# Patient Record
Sex: MALE | Race: WHITE | Employment: FULL TIME | URBAN - METROPOLITAN AREA
[De-identification: names, ages, dates, MRNs, and addresses within clinical notes are randomized per-mention and may not be internally consistent; named-entity substitution may affect disease eponyms.]

---

## 2019-10-22 PROBLEM — M76.62 ACHILLES TENDINITIS OF BOTH LOWER EXTREMITIES: Status: ACTIVE | Noted: 2019-10-22

## 2019-10-22 PROBLEM — M76.61 ACHILLES TENDINITIS OF BOTH LOWER EXTREMITIES: Status: ACTIVE | Noted: 2019-10-22

## 2021-04-19 ENCOUNTER — HOSPITAL ENCOUNTER (OUTPATIENT)
Dept: LAB | Age: 31
Discharge: HOME | End: 2021-04-19
Payer: COMMERCIAL

## 2021-04-19 DIAGNOSIS — G25.81 RLS (RESTLESS LEGS SYNDROME): ICD-10-CM

## 2021-04-19 LAB — FERRITIN SERPL-MCNC: 87 NG/ML (ref 8–388)

## 2021-04-19 PROCEDURE — 36415 COLL VENOUS BLD VENIPUNCTURE: CPT

## 2021-04-19 PROCEDURE — 82728 ASSAY OF FERRITIN: CPT

## 2021-05-12 ENCOUNTER — HOSPITAL ENCOUNTER (OUTPATIENT)
Dept: SLEEP MEDICINE | Age: 31
Discharge: HOME | End: 2021-05-12
Payer: COMMERCIAL

## 2021-05-12 PROCEDURE — 95806 SLEEP STUDY UNATT&RESP EFFT: CPT

## 2022-03-19 PROBLEM — M76.62 ACHILLES TENDINITIS OF BOTH LOWER EXTREMITIES: Status: ACTIVE | Noted: 2019-10-22

## 2022-03-19 PROBLEM — M76.61 ACHILLES TENDINITIS OF BOTH LOWER EXTREMITIES: Status: ACTIVE | Noted: 2019-10-22

## 2022-06-20 ENCOUNTER — OFFICE VISIT (OUTPATIENT)
Dept: ORTHOPEDIC SURGERY | Age: 32
End: 2022-06-20
Payer: COMMERCIAL

## 2022-06-20 VITALS — HEIGHT: 68 IN | WEIGHT: 185 LBS | BODY MASS INDEX: 28.04 KG/M2

## 2022-06-20 DIAGNOSIS — S43.431A LABRAL TEAR OF SHOULDER, RIGHT, INITIAL ENCOUNTER: ICD-10-CM

## 2022-06-20 DIAGNOSIS — M25.511 RIGHT SHOULDER PAIN, UNSPECIFIED CHRONICITY: Primary | ICD-10-CM

## 2022-06-20 PROCEDURE — 20610 DRAIN/INJ JOINT/BURSA W/O US: CPT | Performed by: PHYSICIAN ASSISTANT

## 2022-06-20 RX ORDER — METHYLPREDNISOLONE ACETATE 40 MG/ML
80 INJECTION, SUSPENSION INTRA-ARTICULAR; INTRALESIONAL; INTRAMUSCULAR; SOFT TISSUE ONCE
Status: COMPLETED | OUTPATIENT
Start: 2022-06-20 | End: 2022-06-20

## 2022-06-20 RX ADMIN — METHYLPREDNISOLONE ACETATE 80 MG: 40 INJECTION, SUSPENSION INTRA-ARTICULAR; INTRALESIONAL; INTRAMUSCULAR; SOFT TISSUE at 15:39

## 2022-06-20 NOTE — PROGRESS NOTES
Name: Cristhian Neville  YOB: 1990  Gender: male  MRN: [id number]    CC:   Chief Complaint   Patient presents with    Shoulder Pain     right shoulder pain    Right shoulder pain    HPI:  This patient presents with a 2-month history of Right shoulder pain. 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 He states that he feels some symptoms of instability. He notes superior and posterior shoulder pain. Shoulder pain worsens after being in a position for prolonged period of time and lifting. Patient does note some catching. Does note some mild interference with sleep. He is unsure if he had a possible dislocation several years ago. He has not had any treatment of this shoulder yet. No Known Allergies  History reviewed. No pertinent past medical history. History reviewed. No pertinent surgical history. Family History   Problem Relation Age of Onset    Heart Disease Maternal Grandfather      Social History     Socioeconomic History    Marital status: Single     Spouse name: Not on file    Number of children: Not on file    Years of education: Not on file    Highest education level: Not on file   Occupational History    Not on file   Tobacco Use    Smoking status: Never Smoker    Smokeless tobacco: Never Used   Substance and Sexual Activity    Alcohol use: No    Drug use: Never    Sexual activity: Not on file   Other Topics Concern    Not on file   Social History Narrative    Not on file     Social Determinants of Health     Financial Resource Strain:     Difficulty of Paying Living Expenses: Not on file   Food Insecurity:     Worried About Running Out of Food in the Last Year: Not on file    [place] of Food in the Last Year: Not on file   Transportation Needs:     Lack of Transportation (Medical): Not on file    Lack of Transportation (Non-Medical):  Not on file   Physical Activity:     Days of Exercise per Week: Not on file   [place] of Exercise per Session: Not on file   Stress:     Feeling of Stress : Not on file   Social Connections:     Frequency of Communication with Friends and Family: Not on file    Frequency of Social Gatherings with Friends and Family: Not on file    Attends [employer] Services: Not on file    Active Member of Clubs or Organizations: Not on file    Attends Club or Organization Meetings: Not on file    Marital Status: Not on file   Intimate Partner Violence:     Fear of Current or Ex-Partner: Not on file    Emotionally Abused: Not on file    Physically Abused: Not on file    Sexually Abused: Not on file   Housing Stability:     Unable to Pay for Housing in the Last Year: Not on file    Number of [place] in the Last Year: Not on file    Unstable Housing in the Last Year: Not on file        No flowsheet data found. Review of Systems  Noncontributory    Pertinent positives and negatives are addressed with the patient, particularly those related to musculoskeletal concerns. Non-orthopaedic concerns were referred back to the primary care physician. PE:    GEN: General appearance is that of a healthy patient, alert and oriented, in no distress. WDWN. HEENT:  Normocephalic, atraumatic. Extraocular muscles are intact. Neck:  Supple. Heart:  Regular pulse exam on all extremities. Good color and warmth noted. Lungs:  Normal non-labored breathing with no obvious shortness of breath. Abdomen:  WNL's. Skin:  No signs of rash or bruising. Pysch: Answers questions appropriately, AO X 3. MSK:    Cervical spine: No tenderness. Normal  active motion. Negative Spurling's maneuver.      SHOULDER   Right (Involved) left   Skin Intact Intact   Radial Pulses 2+ Symmetrical 2+ Symmetrical   Deformity None Normal   Myotomes Normal Normal   Dermatomes  Normal Normal    ROM  full Full   Strength  appropriate No weakness   Atrophy None noted None noted   Effusion/Swelling  None noted None noted Palpation  TTP at the shoulder AC joint and biceps tendon No tenderness   Bicep Tendon Rupture  Negative  Negative signs   Bear Hug, Belly Press Negative, negative  Negative   Crossed Arm Adduction Test Normal     Instability/Ant. Apprehension Test  does not have apprehension None noted   Impingement Positive   lesa Lynch, MAGGIE Negative      X-rays:   Grashey, axillary and outlet views of the Right shoulder that were obtained and reviewed today in the office, show a type 2 acromion. The humeral head is not high riding  . No evidence of fracture, arthritis, dislocation or loose body. Mild degenerative changes of the A-C joint. Impression: Right shoulder appears normal  mild    Assessment:     ICD-10-CM    1. Right shoulder pain, unspecified chronicity  M25.511 XR SHOULDER RIGHT (MIN 2 VIEWS)     Amb External Referral To Physical Therapy     methylPREDNISolone acetate (DEPO-MEDROL) injection 80 mg     DRAIN/INJECT LARGE JOINT/BURSA   2. Labral tear of shoulder, right, initial encounter  S43.431A Amb External Referral To Physical Therapy     methylPREDNISolone acetate (DEPO-MEDROL) injection 80 mg     DRAIN/INJECT LARGE JOINT/BURSA        Plan: I had a long discussion with the patient regarding the natural history of the problem, as well as treatment options. I discussed with the patient possible labral pathology. 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 We discussed physical therapy in order to help strengthen the surrounding Muscles. We Will Also Proceed with a Glenohumeral Injection. Patient Will Follow up in 6 Weeks. We Discussed Possible MRI If He Fails Have Improvement. Treatment:     Recommend therapy to evaluate and treat current complaints and pathology. A home exercise program was also discussed with the patient.   Procedure note: After discussion of risks and benefits including, but not limited to pain, infection, steroid flare, increased blood sugar, fat necrosis, skin discoloration, and injury to blood vessels or nerves, the patient verbally consented to proceed with a glenohumeral joint injection. They understand that we are using this is an alternative method of treatment and the decision to not proceed with elective major surgery. We have discussed this decision in detail. The affected right shoulder was sterilely prepped in standard fashion and injected with 2 cc of Depo-Medrol (40mg/ml), 2 cc of 1% Lidocaine, and 2 cc of 0.5% Marcaine into the joint space. The patient tolerated the injection well. Return in 6 weeks (on 8/1/2022), or if symptoms worsen or fail to improve. Thank you for the opportunity to see your patient. If you have any questions please give me a call.   Sincerely,    LORETA Brooke  06/22/22

## 2022-08-24 ENCOUNTER — OFFICE VISIT (OUTPATIENT)
Dept: ORTHOPEDIC SURGERY | Age: 32
End: 2022-08-24

## 2022-08-24 DIAGNOSIS — M25.311 OTHER INSTABILITY, RIGHT SHOULDER: ICD-10-CM

## 2022-08-24 DIAGNOSIS — M25.511 RIGHT SHOULDER PAIN, UNSPECIFIED CHRONICITY: ICD-10-CM

## 2022-08-24 DIAGNOSIS — S43.431A LABRAL TEAR OF SHOULDER, RIGHT, INITIAL ENCOUNTER: Primary | ICD-10-CM

## 2022-08-24 NOTE — PROGRESS NOTES
Name: Claribel Martinez  YOB: 1990  Gender: male  MRN: [id number]    CC:   Chief Complaint   Patient presents with    Shoulder Pain     Right shoulder recheck        HPI: Patient presents for FU of right shoulder possible labral pathology. He was seen eight weeks ago after having feelings of pain and instability of the shoulder. We gave him a glenohumeral injection as well as home exercise program.  The patient states the injection only gave two weeks relief. He notes catching and locking in the shoulder. He states deep and posterior pain. 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 He notes it is the worse in the morning. Denies numbness, tingling and neck pain. No Known Allergies  History reviewed. No pertinent past medical history. History reviewed. No pertinent surgical history. Family History   Problem Relation Age of Onset    Heart Disease Maternal Grandfather      Social History     Socioeconomic History    Marital status: Single     Spouse name: Not on file    Number of children: Not on file    Years of education: Not on file    Highest education level: Not on file   Occupational History    Not on file   Tobacco Use    Smoking status: Never    Smokeless tobacco: Never   Substance and Sexual Activity    Alcohol use: No    Drug use: Never    Sexual activity: Not on file   Other Topics Concern    Not on file   Social History Narrative    Not on file     Social Determinants of Health     Financial Resource Strain: Not on file   Food Insecurity: Not on file   Transportation Needs: Not on file   Physical Activity: Not on file   Stress: Not on file   Social Connections: Not on file   Intimate Partner Violence: Not on file   Housing Stability: Not on file        No flowsheet data found. Review of Systems  Non-contributory    PE right shoulder:    Full passive ROM with pain and clicking while going overhead. Pain to palpation of the biceps tendon.   Positive o'harry's test.  When the patient is supine with the shoulder abduction the 90 degrees, the patient has very limited external rotation. Patient feels it gets stuck as do I on exam.  Positive anterior translation with load and shift. Distal motor function, sensation and pulses intact. There is obvious scapular dyskinesia with winging when going overhead. A/Plan:     ICD-10-CM    1. Labral tear of shoulder, right, initial encounter  S43.431A MRI SHOULDER RIGHT W CONTRAST      2. Right shoulder pain, unspecified chronicity  M25.511 MRI SHOULDER RIGHT W CONTRAST      3. Other instability, right shoulder  M25.311 MRI SHOULDER RIGHT W CONTRAST           I am concerned for labral pathology or even paralabral cyst . Patient has tried and failed conservative treatment with continued pain and instability. We will order an MRI arthrogram and see the patient back after imaging to discuss definitive plans and management. Also discussed possible EMG/NCV if no large labral tear given winging of scapula. Given the chronicity and severity of the patient's symptoms, we will obtain an MRI. We will see them back after the scan and make a determination regarding further treatment. If there is a tear or other problem, they may require surgery. If negative, would recommend NSAID's, PT, or injection. They are amenable to this treatment plan. No follow-ups on file.         Ela Payne  08/24/22

## 2022-09-08 DIAGNOSIS — S43.431A LABRAL TEAR OF SHOULDER, RIGHT, INITIAL ENCOUNTER: ICD-10-CM

## 2022-09-08 DIAGNOSIS — M25.511 RIGHT SHOULDER PAIN, UNSPECIFIED CHRONICITY: ICD-10-CM

## 2022-09-08 DIAGNOSIS — M25.311 OTHER INSTABILITY, RIGHT SHOULDER: ICD-10-CM

## 2022-09-14 ENCOUNTER — OFFICE VISIT (OUTPATIENT)
Dept: ORTHOPEDIC SURGERY | Age: 32
End: 2022-09-14
Payer: COMMERCIAL

## 2022-09-14 DIAGNOSIS — M25.511 RIGHT SHOULDER PAIN, UNSPECIFIED CHRONICITY: Primary | ICD-10-CM

## 2022-09-14 DIAGNOSIS — S43.431A LABRAL TEAR OF SHOULDER, RIGHT, INITIAL ENCOUNTER: ICD-10-CM

## 2022-09-14 DIAGNOSIS — M25.311 OTHER INSTABILITY, RIGHT SHOULDER: ICD-10-CM

## 2022-09-14 PROCEDURE — 99204 OFFICE O/P NEW MOD 45 MIN: CPT | Performed by: ORTHOPAEDIC SURGERY

## 2022-09-14 NOTE — PROGRESS NOTES
Name: Emil Meier  YOB: 1990  Gender: male  MRN: [id number]    CC:   Chief Complaint   Patient presents with    Follow-up     MRI results right shoulder   , Right shoulder(s) pain, instability    HPI:  This patient presents as an MRI FU from my PA. Patient has previously tried and failed [place] [address] [place] on 6-20-22. This patient presents with a 2-month history of Right shoulder pain. 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 He states that he feels some symptoms of instability. He notes superior and posterior shoulder pain. Shoulder pain worsens after being in a position for prolonged period of time and lifting. Patient does note some catching. Does note some mild interference with sleep. He is unsure if he had a possible dislocation several years ago. He has not had any treatment of this shoulder yet. No Known Allergies  History reviewed. No pertinent past medical history. History reviewed. No pertinent surgical history.   Family History   Problem Relation Age of Onset    Heart Disease Maternal Grandfather      Social History     Socioeconomic History    Marital status: Single     Spouse name: Not on file    Number of children: Not on file    Years of education: Not on file    Highest education level: Not on file   Occupational History    Not on file   Tobacco Use    Smoking status: Never    Smokeless tobacco: Never   Substance and Sexual Activity    Alcohol use: No    Drug use: Never    Sexual activity: Not on file   Other Topics Concern    Not on file   Social History Narrative    Not on file     Social Determinants of Health     Financial Resource Strain: Not on file   Food Insecurity: Not on file   Transportation Needs: Not on file   Physical Activity: Not on file   Stress: Not on file   Social Connections: Not on file   Intimate Partner Violence: Not on file   Housing Stability: Not on file        No flowsheet data found.    Review of Systems  Non-contributory   Also noted on the patient medical history form on the chart and are reviewed today. Pertinent positives and negatives are addressed with the patient, particularly those related to musculoskeletal concerns. Non-orthopaedic concerns were referred back to the primary care physician. PE:    GEN: General appearance is that of a healthy patient, alert and oriented, in no distress. WDWN. HEENT:  Normocephalic, atraumatic. Extraocular muscles are intact. Neck:  Supple, no lymphadenopathy. Heart:  Regular pulse exam on all extremities. Good color and warmth noted. Lungs:  Normal non-labored breathing with no obvious shortness of breath. Abdomen:  WNL's. Skin:  No signs of rash or bruising. Pysch: Answers questions appropriately, AO X 3. MSK:  Cervical spine: No tenderness. Good active motion. Negative Spurling's maneuver     SHOULDER   Right (involved)  Left   Skin Intact Intact   Radial Pulses 2+ symmetrical  2+ symmetrical   Myotomes Normal Normal   Dermatomes  Normal Normal   ROM Today he has full passive motion. Full   Strength Mild weakness through ER/IR/Flex has improvement with scapular stabilization. Also has improved range of motion with scapular assistance No weakness   Atrophy None noted None noted   Effusion/Swelling  Minimal swelling None   Palpation Mild anterior shoulder, minimal AC No Tenderness   Bicep Tendon Rupture  Negative, cysts [place]'s causes discomfort positive dynamic abduction shear as well Negative   Lift off test Not tested Not tested   Crossed Arm Adduction Test Not tested Not tested   Instability/Ant.  Apprehension Test Negative None   Impingement Negative Negative        Positive scapular winging positive scapular dyskinesia on dynamic exam    MRI arthrogram right shoulder from 9-8-22:  TECHNIQUE: Multiplanar imaging of the right shoulder was performed using a  shoulder coil configuration in a 1.5 Glo magnet following a fluoroscopic  arthrogram and injection of intraarticular diluted gadolinium. T1  fat-suppressed, spin-density fat-suppressed, T1 and T2 spin-echo sequences  were utilized. FINDINGS: Rim-rent along the anterior to mid footprint of the supraspinatus  tendon. Moderate grade tendinosis of the articular side of the tendon. Mild tendinosis of the footprint insertion of the infraspinatus tendon. Subscapularis tendon appears to be intact. Teres minor tendon intact. Reactive osteitis along the [facility] joint. Type 2 acromion. Moderate amount of noncontrast containing of fluid the subacromial subdeltoid  bursa. Intracapsular and extracapsular segments of biceps tendon intact. There is some intermediate signal noted in the bicipital anchor at its base  suggested on coronal series 5 and series 7, a images 11/12. No detached labral tear. Mild reactive marrow edema of the greater tuberosity. Marrow signal in the  glenoid intact. IMPRESSION: Rim-rent see of the supraspinatus footprint with moderate grade  articular sided tendinosis. Mild footprint tendinosis of the infraspinatus tendon. Reactive osteitis of the [facility] joint. Moderate amount of noncontrast containing fluid the subacromial subdeltoid  bursa. Question low grade SLAP injury. No detached labral tear. Mild reactive marrow edema greater tuberosity. I have independently reviewed the MRI and agree there is [facility] arthritic change. There is evidence of possible separation of the anterior superior labral tissue indicative of questionable SLAP. Small rim rent type tear more on the articular side and bursal side of the supraspinatus seen on coronal image 12 and sagittal image 5. There is an abnormal amount of edema seen superior to the subscap which does travel more medial into the muscle belly than I would expect. No obvious cystic change seen but could not rule out possible muscular strain based on this imaging. Assessment:     ICD-10-CM    1.  Right shoulder pain, unspecified chronicity  M25.511 Ambulatory referral to Physical Therapy     Ambulatory referral to [address]      2. Other instability, right shoulder  M25.311 Ambulatory referral to Physical Therapy     Ambulatory referral to [address]      3. Labral tear of shoulder, right, initial encounter  S43.431A Ambulatory referral to Physical Therapy     Ambulatory referral to [address]:  I had a long discussion with the patient regarding the natural history of the problem, as well as treatment options. Discussed he definitely has scapular dyskinesia and winging which I think is related to his pain and functional issues. I think there could be a component of bicipital involvement also which may be chronic. Would suggest formal therapy to start before I would suggest any type of procedure to address the biceps. Treatment:   Nerve conduction study with EMG for long thoracic nerve evaluation. Recommend therapy to evaluate and treat current complaints and pathology. A home exercise program was also discussed with the patient. Return in about 6 weeks (around 10/26/2022). Thank you for the opportunity to see and take care of your patients. If you ever have any questions please give me a call.    Sincerely,  Modesta Espinosa MD  09/14/22

## 2022-09-21 ENCOUNTER — TELEPHONE (OUTPATIENT)
Dept: FAMILY MEDICINE CLINIC | Facility: CLINIC | Age: 32
End: 2022-09-21

## 2022-11-10 ENCOUNTER — TELEPHONE (OUTPATIENT)
Dept: INTERNAL MEDICINE CLINIC | Facility: CLINIC | Age: 32
End: 2022-11-10

## 2022-11-10 NOTE — TELEPHONE ENCOUNTER
Received fax from [address] [place] [address], need signed and returned. Placed in tray up front.   LT

## 2022-12-21 ENCOUNTER — TELEPHONE (OUTPATIENT)
Dept: FAMILY MEDICINE CLINIC | Facility: CLINIC | Age: 32
End: 2022-12-21

## 2022-12-29 ENCOUNTER — TELEPHONE (OUTPATIENT)
Dept: FAMILY MEDICINE CLINIC | Facility: CLINIC | Age: 32
End: 2022-12-29

## 2022-12-29 NOTE — TELEPHONE ENCOUNTER
Agree with him being scheduled with Dr. Sandi Rapp tomorrow to further evaluate so we will wait on any further treatment at this time

## 2022-12-29 NOTE — TELEPHONE ENCOUNTER
Patient called in on the triage patient complains of severe pressure in head at random times with pressure behind eyes  and temple and switches sides, patient does become slightly disoriented and dizziness patient went to Urgent care 12/24 and was given a shot of prednisone and also given a oral RX of same, patient states today is his last dose and is now starting to feel pressure again, pain level is at level 3, patient states no trouble breathing or mucus when blowing nose doesn't feel like sinus infection.  Patient is scheduled with Elvin tomorrow

## 2022-12-30 ENCOUNTER — OFFICE VISIT (OUTPATIENT)
Dept: FAMILY MEDICINE CLINIC | Facility: CLINIC | Age: 32
End: 2022-12-30
Payer: COMMERCIAL

## 2022-12-30 ENCOUNTER — TELEPHONE (OUTPATIENT)
Dept: FAMILY MEDICINE CLINIC | Facility: CLINIC | Age: 32
End: 2022-12-30

## 2022-12-30 ENCOUNTER — HOSPITAL ENCOUNTER (OUTPATIENT)
Dept: CT IMAGING | Age: 32
Discharge: HOME | End: 2022-12-30
Payer: COMMERCIAL

## 2022-12-30 VITALS
BODY MASS INDEX: 26.67 KG/M2 | DIASTOLIC BLOOD PRESSURE: 79 MMHG | HEART RATE: 82 BPM | HEIGHT: 68 IN | OXYGEN SATURATION: 98 % | TEMPERATURE: 97.6 F | RESPIRATION RATE: 16 BRPM | SYSTOLIC BLOOD PRESSURE: 127 MMHG | WEIGHT: 176 LBS

## 2022-12-30 DIAGNOSIS — U07.1 COVID: ICD-10-CM

## 2022-12-30 DIAGNOSIS — G44.52 NEW DAILY PERSISTENT HEADACHE: Primary | ICD-10-CM

## 2022-12-30 DIAGNOSIS — G44.52 NEW DAILY PERSISTENT HEADACHE: ICD-10-CM

## 2022-12-30 DIAGNOSIS — H53.9 VISUAL DISTURBANCE: ICD-10-CM

## 2022-12-30 PROCEDURE — 99214 OFFICE O/P EST MOD 30 MIN: CPT | Performed by: FAMILY MEDICINE

## 2022-12-30 PROCEDURE — 70450 CT HEAD/BRAIN W/O DYE: CPT

## 2022-12-30 ASSESSMENT — PATIENT HEALTH QUESTIONNAIRE - PHQ9
SUM OF ALL RESPONSES TO PHQ QUESTIONS 1-9: 0
2. FEELING DOWN, DEPRESSED OR HOPELESS: 0
SUM OF ALL RESPONSES TO PHQ QUESTIONS 1-9: 0
SUM OF ALL RESPONSES TO PHQ QUESTIONS 1-9: 0
1. LITTLE INTEREST OR PLEASURE IN DOING THINGS: 0
SUM OF ALL RESPONSES TO PHQ9 QUESTIONS 1 & 2: 0
SUM OF ALL RESPONSES TO PHQ QUESTIONS 1-9: 0

## 2022-12-30 NOTE — TELEPHONE ENCOUNTER
Patient was made aware of the negative head CT. There are no issues with his sinuses either. We will do ibuprofen for 2 days and if that does not help he will switch to Excedrin. He should follow-up with Dr. David Copeland in a week.

## 2022-12-30 NOTE — PROGRESS NOTES
[address] [facility] [place]  Phone: [phone] Fax [phone]  Homer Powell MD  12/30/2022           Mr. Deniz Anderson  is a 33y.o.  year old  male patient who comes in complaining of headache that he has had for the past several weeks. He had COVID about 3 December and had a dull headache and aching. He did not have cough or congestion or nasal pressure or diarrhea or nausea or vomiting. He did have fever to 101. He did lose his smell and taste. Then about a week later he started having headaches that he describes as pressure pressing down on his head and across his temple. It happens every day. It was very severe. He went to urgent care and they gave him a shot of prednisone as well as prednisone to take. It did not really help. He comes in because the head pressure and headache has persisted. He is also noticed that he seemed a little off balance or a little confused though he has been able to work without trouble. He also notes that his vision may be a little off from time to time. He has never had migraines. He does not have light or noise sensitivity. He does not have trouble moving 1 side of his body or the other. Does note that his taste and smell are almost 80% returned to normal.  Headache may be a little bit better. He is otherwise healthy. Mr. Deniz Anderson  has  has no past medical history on file. Mr. Deniz Anderson  has  has no past surgical history on file. Mr. Deniz Anderson   No current outpatient medications on file. No current facility-administered medications for this visit.        Mr. Castanon Christina History     Socioeconomic History    Marital status: Single     Spouse name: None    Number of children: None    Years of education: None    Highest education level: None   Tobacco Use    Smoking status: Never    Smokeless tobacco: Never   Substance and Sexual Activity    Alcohol use: No    Drug use: Never       Mr. Deniz Anderson Family History   Problem Relation Age of Onset    Heart Disease Maternal Grandfather             Mr. Gentry Ortez  has the following allergies: No Known Allergies    /79   Pulse 82   Temp 97.6 °F (36.4 °C)   Resp 16   Ht 5' 7.5\" (1.715 m)   Wt 176 lb (79.8 kg)   SpO2 98%   BMI 27.16 kg/m²     HEENT: Normocephalic, atraumatic, pupils equal and reactive to light. Tympanic membranes intact without erythema or edema. Oropharynx clear. Neck: Supple, no masses or thyromegaly. Lungs: clear to auscultation bilaterally. CV: regular rate and rhythm, without murmurs, rubs, or gallops  Neuro: Cranial nerves II through XII are intact, fundi without papilledema, DTRs are 2+ both upper and lower extremities. No focal weakness. Ext: No lower extremity edema. Sara Sneed was seen today for headache, dizziness and post-covid symptoms. Diagnoses and all orders for this visit:    New daily persistent headache  -     CT HEAD WO CONTRAST; Future    COVID  -     CT HEAD WO CONTRAST; Future    Visual disturbance  -     CT HEAD WO CONTRAST; Future    I am concerned that this is a post COVID headache but certainly because it is a new headache for him and its persisted for a long time we will get a head CT. He is to do ibuprofen two twice a day and if that does not work he can can try Excedrin. He is to follow-up with Dr. Manuel Hebert in 1 to 2 weeks.   Jose A Yuan MD

## 2023-01-02 ENCOUNTER — TELEPHONE (OUTPATIENT)
Dept: FAMILY MEDICINE CLINIC | Facility: CLINIC | Age: 33
End: 2023-01-02

## 2023-01-04 ENCOUNTER — TELEPHONE (OUTPATIENT)
Dept: FAMILY MEDICINE CLINIC | Facility: CLINIC | Age: 33
End: 2023-01-04

## 2023-01-04 NOTE — TELEPHONE ENCOUNTER
Pt called stating that he has left arm tingling/ numbness and facial numbness. Pt states that he is very fatigue and just having very weird body sensations. He has been to Urgent care and was told it was post covid. Pt is very concerned. I advised him to seek emergent care at the ER.  Pt is agreeable

## 2023-01-06 ENCOUNTER — OFFICE VISIT (OUTPATIENT)
Dept: FAMILY MEDICINE CLINIC | Facility: CLINIC | Age: 33
End: 2023-01-06
Payer: COMMERCIAL

## 2023-01-06 VITALS
BODY MASS INDEX: 26.58 KG/M2 | HEIGHT: 68 IN | WEIGHT: 175.4 LBS | TEMPERATURE: 99.9 F | HEART RATE: 76 BPM | RESPIRATION RATE: 16 BRPM | OXYGEN SATURATION: 98 % | SYSTOLIC BLOOD PRESSURE: 121 MMHG | DIASTOLIC BLOOD PRESSURE: 70 MMHG

## 2023-01-06 DIAGNOSIS — F41.1 GENERALIZED ANXIETY DISORDER WITH PANIC ATTACKS: ICD-10-CM

## 2023-01-06 DIAGNOSIS — U07.1 COVID: Primary | ICD-10-CM

## 2023-01-06 DIAGNOSIS — F41.0 GENERALIZED ANXIETY DISORDER WITH PANIC ATTACKS: ICD-10-CM

## 2023-01-06 PROCEDURE — 99213 OFFICE O/P EST LOW 20 MIN: CPT | Performed by: FAMILY MEDICINE

## 2023-01-06 RX ORDER — LORAZEPAM 1 MG/1
1 TABLET ORAL DAILY PRN
Qty: 10 TABLET | Refills: 0 | Status: SHIPPED | OUTPATIENT
Start: 2023-01-06 | End: 2023-01-20 | Stop reason: SDUPTHER

## 2023-01-06 RX ORDER — METOCLOPRAMIDE 5 MG/1
5 TABLET ORAL 4 TIMES DAILY
COMMUNITY

## 2023-01-06 RX ORDER — BUTALBITAL, ACETAMINOPHEN AND CAFFEINE 50; 325; 40 MG/1; MG/1; MG/1
1 TABLET ORAL EVERY 6 HOURS PRN
COMMUNITY
Start: 2023-01-04

## 2023-01-06 ASSESSMENT — PATIENT HEALTH QUESTIONNAIRE - PHQ9
SUM OF ALL RESPONSES TO PHQ QUESTIONS 1-9: 0
SUM OF ALL RESPONSES TO PHQ QUESTIONS 1-9: 0
2. FEELING DOWN, DEPRESSED OR HOPELESS: 0
SUM OF ALL RESPONSES TO PHQ QUESTIONS 1-9: 0
1. LITTLE INTEREST OR PLEASURE IN DOING THINGS: 0
SUM OF ALL RESPONSES TO PHQ9 QUESTIONS 1 & 2: 0
SUM OF ALL RESPONSES TO PHQ QUESTIONS 1-9: 0

## 2023-01-06 ASSESSMENT — ANXIETY QUESTIONNAIRES
6. BECOMING EASILY ANNOYED OR IRRITABLE: 0
5. BEING SO RESTLESS THAT IT IS HARD TO SIT STILL: 0
GAD7 TOTAL SCORE: 0
3. WORRYING TOO MUCH ABOUT DIFFERENT THINGS: 0
1. FEELING NERVOUS, ANXIOUS, OR ON EDGE: 0
IF YOU CHECKED OFF ANY PROBLEMS ON THIS QUESTIONNAIRE, HOW DIFFICULT HAVE THESE PROBLEMS MADE IT FOR YOU TO DO YOUR WORK, TAKE CARE OF THINGS AT HOME, OR GET ALONG WITH OTHER PEOPLE: NOT DIFFICULT AT ALL
2. NOT BEING ABLE TO STOP OR CONTROL WORRYING: 0
4. TROUBLE RELAXING: 0
7. FEELING AFRAID AS IF SOMETHING AWFUL MIGHT HAPPEN: 0

## 2023-01-06 NOTE — PROGRESS NOTES
Js Andre (:  1990) is a 33 y.o. male,Established patient, here for evaluation of the following chief complaint(s):  Follow-up (ED          ASSESSMENT/PLAN:  1. COVID  -     nirmatrelvir/ritonavir (PAXLOVID) 20 x 150 MG & 10 x 100MG TBPK; Take 3 tablets (two 150 mg nirmatrelvir and one 100 mg ritonavir tablets) by mouth every 12 hours for 5 days. , Disp-30 tablet, R-0Normal    Tested positiive for COVId in December; symptoms improved. re-tested positive 2 days ago; now with fatigue, fever. Encuraged paxlovid to improve symptoms. May use tylenol/ motrin for muscle aches, fever. Subjective   SUBJECTIVE/OBJECTIVE:  HPI    32 yo otherwise healthy male. Here for fatigue, headaches, fever, chills. Tested positive for COVID 2 days ago at urgent care. told pt he had covid via pcr test on Wed night. Fatigue has got much worse, headaches, head pressure over past 3 days, having tingling and numbness all over body, chills and sweating phases. Last night was the worst  He has not been vacinated for COVID. Review of Systems   Constitutional:  Positive for appetite change, chills, fatigue and fever. Respiratory:  Positive for cough. Negative for shortness of breath and wheezing. Cardiovascular: Negative. Negative for chest pain, palpitations and leg swelling. Gastrointestinal: Negative. Musculoskeletal:  Positive for myalgias. Neurological:  Positive for headaches. Objective   Physical Exam  Vitals and nursing note reviewed. Constitutional:       General: He is not in acute distress. Appearance: He is ill-appearing. He is not toxic-appearing. Cardiovascular:      Rate and Rhythm: Normal rate and regular rhythm. Pulmonary:      Effort: Pulmonary effort is normal. No respiratory distress. Breath sounds: Normal breath sounds. No wheezing. Skin:     General: Skin is warm and dry. Neurological:      Mental Status: He is alert.    Psychiatric:         Mood and Affect: Mood normal.         Behavior: Behavior normal.         Thought Content: Thought content normal.         Judgment: Judgment normal.        Vitals:    01/06/23 1208   BP: 121/70   Pulse: 76   Resp: 16   Temp: 99.9 °F (37.7 °C)   SpO2: 98%     No results found for this visit on 01/06/23. On this date 1/6/2023 I have spent 30 minutes reviewing previous notes, test results and face to face with the patient discussing the diagnosis and importance of compliance with the treatment plan as well as documenting on the day of the visit. An electronic signature was used to authenticate this note.     --Humphrey Morfin MD, MD

## 2023-01-16 ASSESSMENT — ENCOUNTER SYMPTOMS
COUGH: 1
GASTROINTESTINAL NEGATIVE: 1
WHEEZING: 0
SHORTNESS OF BREATH: 0

## 2023-01-20 ENCOUNTER — NURSE ONLY (OUTPATIENT)
Dept: FAMILY MEDICINE CLINIC | Facility: CLINIC | Age: 33
End: 2023-01-20

## 2023-01-20 ENCOUNTER — OFFICE VISIT (OUTPATIENT)
Dept: FAMILY MEDICINE CLINIC | Facility: CLINIC | Age: 33
End: 2023-01-20
Payer: COMMERCIAL

## 2023-01-20 VITALS
WEIGHT: 176.1 LBS | DIASTOLIC BLOOD PRESSURE: 77 MMHG | OXYGEN SATURATION: 98 % | HEIGHT: 68 IN | SYSTOLIC BLOOD PRESSURE: 123 MMHG | RESPIRATION RATE: 18 BRPM | BODY MASS INDEX: 26.69 KG/M2 | HEART RATE: 76 BPM | TEMPERATURE: 97.6 F

## 2023-01-20 DIAGNOSIS — R53.82 CHRONIC FATIGUE, UNSPECIFIED: ICD-10-CM

## 2023-01-20 DIAGNOSIS — G44.52 NEW DAILY PERSISTENT HEADACHE: ICD-10-CM

## 2023-01-20 DIAGNOSIS — R11.0 NAUSEA: ICD-10-CM

## 2023-01-20 DIAGNOSIS — Z86.16 HISTORY OF COVID-19: ICD-10-CM

## 2023-01-20 DIAGNOSIS — H53.9 VISUAL DISTURBANCE: ICD-10-CM

## 2023-01-20 DIAGNOSIS — F41.0 GENERALIZED ANXIETY DISORDER WITH PANIC ATTACKS: Primary | ICD-10-CM

## 2023-01-20 DIAGNOSIS — F41.1 GENERALIZED ANXIETY DISORDER WITH PANIC ATTACKS: Primary | ICD-10-CM

## 2023-01-20 LAB
ALBUMIN SERPL-MCNC: 3.8 G/DL (ref 3.5–5)
ALBUMIN/GLOB SERPL: 1.2 (ref 0.4–1.6)
ALP SERPL-CCNC: 54 U/L (ref 50–136)
ALT SERPL-CCNC: 36 U/L (ref 12–65)
ANION GAP SERPL CALC-SCNC: 6 MMOL/L (ref 2–11)
AST SERPL-CCNC: 14 U/L (ref 15–37)
BASOPHILS # BLD: 0 K/UL (ref 0–0.2)
BASOPHILS NFR BLD: 0 % (ref 0–2)
BILIRUB SERPL-MCNC: 0.3 MG/DL (ref 0.2–1.1)
BUN SERPL-MCNC: 15 MG/DL (ref 6–23)
CALCIUM SERPL-MCNC: 10 MG/DL (ref 8.3–10.4)
CHLORIDE SERPL-SCNC: 109 MMOL/L (ref 101–110)
CO2 SERPL-SCNC: 27 MMOL/L (ref 21–32)
CREAT SERPL-MCNC: 1.2 MG/DL (ref 0.8–1.5)
DIFFERENTIAL METHOD BLD: NORMAL
EOSINOPHIL # BLD: 0.2 K/UL (ref 0–0.8)
EOSINOPHIL NFR BLD: 3 % (ref 0.5–7.8)
ERYTHROCYTE [DISTWIDTH] IN BLOOD BY AUTOMATED COUNT: 12.8 % (ref 11.9–14.6)
GLOBULIN SER CALC-MCNC: 3.2 G/DL (ref 2.8–4.5)
GLUCOSE SERPL-MCNC: 93 MG/DL (ref 65–100)
HCT VFR BLD AUTO: 45.7 % (ref 41.1–50.3)
HGB BLD-MCNC: 14.7 G/DL (ref 13.6–17.2)
IMM GRANULOCYTES # BLD AUTO: 0 K/UL (ref 0–0.5)
IMM GRANULOCYTES NFR BLD AUTO: 0 % (ref 0–5)
LYMPHOCYTES # BLD: 2.8 K/UL (ref 0.5–4.6)
LYMPHOCYTES NFR BLD: 41 % (ref 13–44)
MCH RBC QN AUTO: 28.9 PG (ref 26.1–32.9)
MCHC RBC AUTO-ENTMCNC: 32.2 G/DL (ref 31.4–35)
MCV RBC AUTO: 89.8 FL (ref 82–102)
MONOCYTES # BLD: 0.5 K/UL (ref 0.1–1.3)
MONOCYTES NFR BLD: 7 % (ref 4–12)
NEUTS SEG # BLD: 3.5 K/UL (ref 1.7–8.2)
NEUTS SEG NFR BLD: 49 % (ref 43–78)
NRBC # BLD: 0 K/UL (ref 0–0.2)
PLATELET # BLD AUTO: 225 K/UL (ref 150–450)
PMV BLD AUTO: 11.3 FL (ref 9.4–12.3)
POTASSIUM SERPL-SCNC: 5.1 MMOL/L (ref 3.5–5.1)
PROT SERPL-MCNC: 7 G/DL (ref 6.3–8.2)
RBC # BLD AUTO: 5.09 M/UL (ref 4.23–5.6)
SODIUM SERPL-SCNC: 142 MMOL/L (ref 133–143)
TSH, 3RD GENERATION: 2.48 UIU/ML (ref 0.36–3.74)
WBC # BLD AUTO: 7 K/UL (ref 4.3–11.1)

## 2023-01-20 PROCEDURE — 99214 OFFICE O/P EST MOD 30 MIN: CPT | Performed by: FAMILY MEDICINE

## 2023-01-20 RX ORDER — LORAZEPAM 1 MG/1
1 TABLET ORAL DAILY PRN
Qty: 15 TABLET | Refills: 2 | Status: SHIPPED | OUTPATIENT
Start: 2023-01-20 | End: 2023-07-19

## 2023-01-20 RX ORDER — ONDANSETRON 8 MG/1
8 TABLET, ORALLY DISINTEGRATING ORAL DAILY
COMMUNITY
Start: 2023-01-16 | End: 2023-01-20 | Stop reason: SDUPTHER

## 2023-01-20 RX ORDER — ONDANSETRON 8 MG/1
8 TABLET, ORALLY DISINTEGRATING ORAL DAILY
Qty: 30 TABLET | Refills: 5 | Status: SHIPPED | OUTPATIENT
Start: 2023-01-20

## 2023-01-20 RX ORDER — AMOXICILLIN AND CLAVULANATE POTASSIUM 875; 125 MG/1; MG/1
TABLET, FILM COATED ORAL
COMMUNITY
Start: 2023-01-14

## 2023-01-20 RX ORDER — ESCITALOPRAM OXALATE 10 MG/1
10 TABLET ORAL DAILY
Qty: 30 TABLET | Refills: 3 | Status: SHIPPED | OUTPATIENT
Start: 2023-01-20

## 2023-01-20 ASSESSMENT — ENCOUNTER SYMPTOMS
GASTROINTESTINAL NEGATIVE: 1
RESPIRATORY NEGATIVE: 1

## 2023-01-20 NOTE — PROGRESS NOTES
Mitali Kat (:  1990) is a 33 y.o. male,Established patient, here for evaluation of the following chief complaint(s):  Follow-up (COVID/ Health issues, feels like he is slowly making progress         ASSESSMENT/PLAN:  1. Generalized anxiety disorder with panic attacks  -     escitalopram (LEXAPRO) 10 MG tablet; Take 1 tablet by mouth daily, Disp-30 tablet, R-3Normal  -     LORazepam (ATIVAN) 1 MG tablet; Take 1 tablet by mouth daily as needed (panic attacks) for up to 180 days. Max Daily Amount: 1 mg, Disp-15 tablet, R-2Normal  2. New daily persistent headache  3. History of COVID-19  4. Visual disturbance  5. Nausea  -     ondansetron (ZOFRAN-ODT) 8 MG TBDP disintegrating tablet; Take 1 tablet by mouth daily, Disp-30 tablet, R-5Normal  6. Chronic fatigue, unspecified  -     Comprehensive Metabolic Panel; Future  -     CBC with Auto Differential; Future  -     TSH; Future      Plan:  Check labs today to rule out other reasons for the chronic fatigue. He already has follow-up with neurology. Return in about 6 weeks (around 3/3/2023) for Labs today, EOV. Subjective   SUBJECTIVE/OBJECTIVE:  HPI  69-year-old male here for 2-week follow-up. Tested positive for COVID in early December and then again on . Since that time he has described Low energy: weakness, profound fatigue  \"Couldn't pull covers over him he was so weak\"  Muscle aches. Fever, chills. Intermittent headaches. O2 at home was wnl    He did take Paxil bid 3 weeks ago. Has seen his ENT:Dr. Nicole Mendoza:  Diagnosed with sinusitis via CT scan  Treated with Augmentin. Now, he feels marginally better. Feeling very anxious about his health conditions. Years ago was tried on citalopram but he reports he did not take it for long. We have prescribed him Ativan as needed for his anxiety/panic attacks  Willing to retry SSRI.       Review of Systems   Constitutional:  Negative for activity change, appetite change, chills, diaphoresis, fatigue and fever. Respiratory: Negative. Cardiovascular: Negative. Gastrointestinal: Negative. Skin: Negative. Neurological:  Negative for headaches. Psychiatric/Behavioral: Negative. Objective   Physical Exam  Vitals and nursing note reviewed. Constitutional:       General: He is not in acute distress. Appearance: He is not ill-appearing or toxic-appearing. Cardiovascular:      Rate and Rhythm: Normal rate and regular rhythm. Pulmonary:      Effort: Pulmonary effort is normal. No respiratory distress. Breath sounds: Normal breath sounds. No wheezing. Neurological:      General: No focal deficit present. Mental Status: He is alert and oriented to person, place, and time. Psychiatric:         Mood and Affect: Mood is anxious. Speech: Speech normal.         Behavior: Behavior is cooperative. Vitals:    01/20/23 0806   BP: 123/77   Pulse: 76   Resp: 18   Temp: 97.6 °F (36.4 °C)   SpO2: 98%       On this date 1/20/2023 I have spent 30 minutes reviewing previous notes, test results and face to face with the patient discussing the diagnosis and importance of compliance with the treatment plan as well as documenting on the day of the visit. An electronic signature was used to authenticate this note.     --Mark Patino MD, MD

## 2023-02-09 ENCOUNTER — NURSE ONLY (OUTPATIENT)
Dept: FAMILY MEDICINE CLINIC | Facility: CLINIC | Age: 33
End: 2023-02-09

## 2023-02-09 DIAGNOSIS — R53.82 CHRONIC FATIGUE, UNSPECIFIED: ICD-10-CM

## 2023-02-09 LAB
ERYTHROCYTE [SEDIMENTATION RATE] IN BLOOD: 3 MM/HR (ref 0–20)
T4 FREE SERPL-MCNC: 0.9 NG/DL (ref 0.78–1.46)

## 2023-02-10 LAB
ANA SER QL: NEGATIVE
RHEUMATOID FACT SER QL LA: NEGATIVE

## 2023-02-12 LAB — T3FREE SERPL-MCNC: 2.8 PG/ML (ref 2–4.4)

## 2023-03-03 ENCOUNTER — OFFICE VISIT (OUTPATIENT)
Dept: FAMILY MEDICINE CLINIC | Facility: CLINIC | Age: 33
End: 2023-03-03
Payer: COMMERCIAL

## 2023-03-03 VITALS
SYSTOLIC BLOOD PRESSURE: 113 MMHG | HEART RATE: 78 BPM | TEMPERATURE: 97.3 F | WEIGHT: 181.3 LBS | BODY MASS INDEX: 27.48 KG/M2 | DIASTOLIC BLOOD PRESSURE: 73 MMHG | RESPIRATION RATE: 18 BRPM | HEIGHT: 68 IN | OXYGEN SATURATION: 98 %

## 2023-03-03 DIAGNOSIS — U09.9 COVID-19 LONG HAULER: Primary | ICD-10-CM

## 2023-03-03 DIAGNOSIS — F41.0 GENERALIZED ANXIETY DISORDER WITH PANIC ATTACKS: ICD-10-CM

## 2023-03-03 DIAGNOSIS — F41.1 GENERALIZED ANXIETY DISORDER WITH PANIC ATTACKS: ICD-10-CM

## 2023-03-03 DIAGNOSIS — Z86.16 HISTORY OF COVID-19: ICD-10-CM

## 2023-03-03 PROCEDURE — 99214 OFFICE O/P EST MOD 30 MIN: CPT | Performed by: FAMILY MEDICINE

## 2023-03-03 RX ORDER — LORAZEPAM 1 MG/1
TABLET ORAL
Qty: 30 TABLET | Refills: 2 | Status: SHIPPED | OUTPATIENT
Start: 2023-03-03 | End: 2023-05-31

## 2023-03-03 SDOH — ECONOMIC STABILITY: FOOD INSECURITY: WITHIN THE PAST 12 MONTHS, YOU WORRIED THAT YOUR FOOD WOULD RUN OUT BEFORE YOU GOT MONEY TO BUY MORE.: NEVER TRUE

## 2023-03-03 SDOH — ECONOMIC STABILITY: INCOME INSECURITY: HOW HARD IS IT FOR YOU TO PAY FOR THE VERY BASICS LIKE FOOD, HOUSING, MEDICAL CARE, AND HEATING?: NOT HARD AT ALL

## 2023-03-03 SDOH — ECONOMIC STABILITY: FOOD INSECURITY: WITHIN THE PAST 12 MONTHS, THE FOOD YOU BOUGHT JUST DIDN'T LAST AND YOU DIDN'T HAVE MONEY TO GET MORE.: NEVER TRUE

## 2023-03-03 SDOH — ECONOMIC STABILITY: HOUSING INSECURITY
IN THE LAST 12 MONTHS, WAS THERE A TIME WHEN YOU DID NOT HAVE A STEADY PLACE TO SLEEP OR SLEPT IN A SHELTER (INCLUDING NOW)?: NO

## 2023-03-03 ASSESSMENT — PATIENT HEALTH QUESTIONNAIRE - PHQ9
1. LITTLE INTEREST OR PLEASURE IN DOING THINGS: 0
2. FEELING DOWN, DEPRESSED OR HOPELESS: 0
SUM OF ALL RESPONSES TO PHQ QUESTIONS 1-9: 0
SUM OF ALL RESPONSES TO PHQ9 QUESTIONS 1 & 2: 0
SUM OF ALL RESPONSES TO PHQ QUESTIONS 1-9: 0

## 2023-03-03 NOTE — PROGRESS NOTES
Sabiha Reis (:  1990) is a 33 y.o. male,Established patient, here for evaluation of the following chief complaint(s):  Follow-up (Long covid -- progress but slow progress )         ASSESSMENT/PLAN:  1. COVID-19 long hauler  2. Generalized anxiety disorder with panic attacks  -     LORazepam (ATIVAN) 1 MG tablet; Take 1/2 to 1 tablet daily as needed for anxiety. Maximum 1 tablet daily. , Disp-30 tablet, R-2Normal  3. History of COVID-19    Continue to increase exercise slowly. Continue eating healthy. He has follow-up with neurology soon. Subjective   SUBJECTIVE/OBJECTIVE:  HPI  52-year-old male here for 6-week follow-up. Was experiencing profound fatigue, weakness secondary to long COVID    Tested positive for COVID in early December and then again on .    reports slowly doing better. Slowly getting back energy. He has been able to do a moderate amount of exercise. He is concerned that he will have a relapse of his profound fatigue and weakness from COVID. Sleep:  Decent at night. Cannot nap. Has tried to clean up his diet. Trying to eat \"lower histamine\" foods. Fasts til lunch; Vegetables, no fried foods; chicken; protein shakes. Has not been to work in 2 months. Works at a [employer] store. Feels like the lexapro has \"taken the edge off his anxiety\" especially with the changes in his body. Taking H1 and H2 blockers. Has just started accupuncture. Has contacted [address] [place] [address] for participation in a [facility] study. Previous note:  Since that time he has described Low energy: weakness, profound fatigue  \"Couldn't pull covers over him he was so weak\"  Muscle aches. Fever, chills. Intermittent headaches. O2 at home was wnl       Has seen his ENT:Dr. Kaylynn Eckert:  Diagnosed with sinusitis via CT scan  Treated with Augmentin. Now, he feels marginally better. Feeling very anxious about his health conditions.   Years ago was tried on citalopram but he reports he did not take it for long. We have prescribed him Ativan as needed for his anxiety/panic attacks  Willing to retry SSRI. Energy is slowly progressing. Still having weird sensations in body; much less than before. Will get fatigued with any activity. Able to do basic household activities. Not able to go to the gym. Can go to grocery store on some days. However, then has to rest for 1-2 days. Objective   Physical Exam  Vitals and nursing note reviewed. Constitutional:       General: He is not in acute distress. Appearance: He is not ill-appearing or toxic-appearing. Cardiovascular:      Rate and Rhythm: Normal rate and regular rhythm. Pulmonary:      Effort: Pulmonary effort is normal.      Breath sounds: Normal breath sounds. Neurological:      General: No focal deficit present. Mental Status: He is alert and oriented to person, place, and time. Psychiatric:         Mood and Affect: Mood normal.         Thought Content: Thought content normal.         Judgment: Judgment normal.        Vitals:    03/03/23 1055   BP: 113/73   Pulse: 78   Resp: 18   Temp: 97.3 °F (36.3 °C)   SpO2: 98%       On this date 3/3/2023 I have spent 30 minutes reviewing previous notes, test results and face to face with the patient discussing the diagnosis and importance of compliance with the treatment plan as well as documenting on the day of the visit. An electronic signature was used to authenticate this note.     --Sony Price MD

## 2023-03-15 PROBLEM — F41.0 GENERALIZED ANXIETY DISORDER WITH PANIC ATTACKS: Status: ACTIVE | Noted: 2023-03-15

## 2023-03-15 PROBLEM — F41.1 GENERALIZED ANXIETY DISORDER WITH PANIC ATTACKS: Status: ACTIVE | Noted: 2023-03-15

## 2023-03-15 PROBLEM — U09.9 COVID-19 LONG HAULER: Status: ACTIVE | Noted: 2023-03-15

## 2024-04-18 ENCOUNTER — OFFICE VISIT (OUTPATIENT)
Dept: FAMILY MEDICINE CLINIC | Facility: CLINIC | Age: 34
End: 2024-04-18
Payer: COMMERCIAL

## 2024-04-18 VITALS
HEIGHT: 68 IN | HEART RATE: 79 BPM | DIASTOLIC BLOOD PRESSURE: 71 MMHG | OXYGEN SATURATION: 98 % | BODY MASS INDEX: 32.18 KG/M2 | SYSTOLIC BLOOD PRESSURE: 108 MMHG | TEMPERATURE: 99.1 F | WEIGHT: 212.3 LBS | RESPIRATION RATE: 16 BRPM

## 2024-04-18 DIAGNOSIS — R20.0 NUMBNESS AND TINGLING OF BOTH LEGS BELOW KNEES: ICD-10-CM

## 2024-04-18 DIAGNOSIS — R53.83 OTHER FATIGUE: ICD-10-CM

## 2024-04-18 DIAGNOSIS — F41.0 GENERALIZED ANXIETY DISORDER WITH PANIC ATTACKS: Primary | ICD-10-CM

## 2024-04-18 DIAGNOSIS — R20.2 NUMBNESS AND TINGLING OF BOTH LEGS BELOW KNEES: ICD-10-CM

## 2024-04-18 DIAGNOSIS — R53.82 CHRONIC FATIGUE, UNSPECIFIED: ICD-10-CM

## 2024-04-18 DIAGNOSIS — F41.1 GENERALIZED ANXIETY DISORDER WITH PANIC ATTACKS: Primary | ICD-10-CM

## 2024-04-18 DIAGNOSIS — U09.9 LONG COVID: ICD-10-CM

## 2024-04-18 LAB
ALBUMIN SERPL-MCNC: 4 G/DL (ref 3.5–5)
ALBUMIN/GLOB SERPL: 1.4 (ref 0.4–1.6)
ALP SERPL-CCNC: 70 U/L (ref 50–136)
ALT SERPL-CCNC: 37 U/L (ref 12–65)
ANION GAP SERPL CALC-SCNC: 3 MMOL/L (ref 2–11)
AST SERPL-CCNC: 20 U/L (ref 15–37)
BASOPHILS # BLD: 0 K/UL (ref 0–0.2)
BASOPHILS NFR BLD: 1 % (ref 0–2)
BILIRUB SERPL-MCNC: 0.3 MG/DL (ref 0.2–1.1)
BUN SERPL-MCNC: 21 MG/DL (ref 6–23)
CALCIUM SERPL-MCNC: 9.2 MG/DL (ref 8.3–10.4)
CHLORIDE SERPL-SCNC: 109 MMOL/L (ref 103–113)
CO2 SERPL-SCNC: 26 MMOL/L (ref 21–32)
CREAT SERPL-MCNC: 1.1 MG/DL (ref 0.8–1.5)
DIFFERENTIAL METHOD BLD: NORMAL
EOSINOPHIL # BLD: 0.2 K/UL (ref 0–0.8)
EOSINOPHIL NFR BLD: 3 % (ref 0.5–7.8)
ERYTHROCYTE [DISTWIDTH] IN BLOOD BY AUTOMATED COUNT: 13.2 % (ref 11.9–14.6)
GLOBULIN SER CALC-MCNC: 2.9 G/DL (ref 2.8–4.5)
GLUCOSE SERPL-MCNC: 101 MG/DL (ref 65–100)
HCT VFR BLD AUTO: 47.1 % (ref 41.1–50.3)
HGB BLD-MCNC: 15 G/DL (ref 13.6–17.2)
IMM GRANULOCYTES # BLD AUTO: 0 K/UL (ref 0–0.5)
IMM GRANULOCYTES NFR BLD AUTO: 0 % (ref 0–5)
LYMPHOCYTES # BLD: 3.2 K/UL (ref 0.5–4.6)
LYMPHOCYTES NFR BLD: 38 % (ref 13–44)
MCH RBC QN AUTO: 28.2 PG (ref 26.1–32.9)
MCHC RBC AUTO-ENTMCNC: 31.8 G/DL (ref 31.4–35)
MCV RBC AUTO: 88.7 FL (ref 82–102)
MONOCYTES # BLD: 0.6 K/UL (ref 0.1–1.3)
MONOCYTES NFR BLD: 8 % (ref 4–12)
NEUTS SEG # BLD: 4.3 K/UL (ref 1.7–8.2)
NEUTS SEG NFR BLD: 50 % (ref 43–78)
NRBC # BLD: 0 K/UL (ref 0–0.2)
PLATELET # BLD AUTO: 250 K/UL (ref 150–450)
PMV BLD AUTO: 11.2 FL (ref 9.4–12.3)
POTASSIUM SERPL-SCNC: 4.2 MMOL/L (ref 3.5–5.1)
PROT SERPL-MCNC: 6.9 G/DL (ref 6.3–8.2)
RBC # BLD AUTO: 5.31 M/UL (ref 4.23–5.6)
SODIUM SERPL-SCNC: 138 MMOL/L (ref 136–146)
TSH, 3RD GENERATION: 1.72 UIU/ML (ref 0.36–3.74)
WBC # BLD AUTO: 8.4 K/UL (ref 4.3–11.1)

## 2024-04-18 PROCEDURE — 99214 OFFICE O/P EST MOD 30 MIN: CPT | Performed by: NURSE PRACTITIONER

## 2024-04-18 RX ORDER — ESCITALOPRAM OXALATE 20 MG/1
20 TABLET ORAL DAILY
Qty: 90 TABLET | Refills: 3 | Status: CANCELLED | OUTPATIENT
Start: 2024-04-18

## 2024-04-18 RX ORDER — ESCITALOPRAM OXALATE 10 MG/1
10 TABLET ORAL DAILY
Qty: 30 TABLET | Refills: 3 | Status: SHIPPED | OUTPATIENT
Start: 2024-04-18

## 2024-04-18 SDOH — ECONOMIC STABILITY: FOOD INSECURITY: WITHIN THE PAST 12 MONTHS, YOU WORRIED THAT YOUR FOOD WOULD RUN OUT BEFORE YOU GOT MONEY TO BUY MORE.: NEVER TRUE

## 2024-04-18 SDOH — ECONOMIC STABILITY: FOOD INSECURITY: WITHIN THE PAST 12 MONTHS, THE FOOD YOU BOUGHT JUST DIDN'T LAST AND YOU DIDN'T HAVE MONEY TO GET MORE.: NEVER TRUE

## 2024-04-18 SDOH — ECONOMIC STABILITY: INCOME INSECURITY: HOW HARD IS IT FOR YOU TO PAY FOR THE VERY BASICS LIKE FOOD, HOUSING, MEDICAL CARE, AND HEATING?: NOT HARD AT ALL

## 2024-04-18 ASSESSMENT — PATIENT HEALTH QUESTIONNAIRE - PHQ9
2. FEELING DOWN, DEPRESSED OR HOPELESS: NOT AT ALL
SUM OF ALL RESPONSES TO PHQ QUESTIONS 1-9: 0
SUM OF ALL RESPONSES TO PHQ QUESTIONS 1-9: 0
1. LITTLE INTEREST OR PLEASURE IN DOING THINGS: NOT AT ALL
SUM OF ALL RESPONSES TO PHQ9 QUESTIONS 1 & 2: 0
SUM OF ALL RESPONSES TO PHQ QUESTIONS 1-9: 0
SUM OF ALL RESPONSES TO PHQ QUESTIONS 1-9: 0

## 2024-04-18 ASSESSMENT — ENCOUNTER SYMPTOMS
GASTROINTESTINAL NEGATIVE: 1
RESPIRATORY NEGATIVE: 1

## 2024-04-18 NOTE — PROGRESS NOTES
Panel  -     CBC with Auto Differential    Chronic fatigue, unspecified  -     CBC with Auto Differential; Future  -     Comprehensive Metabolic Panel; Future  -     Vitamin B12; Future  -     TSH; Future  -     TSH  -     Vitamin B12  -     Comprehensive Metabolic Panel  -     CBC with Auto Differential    Other fatigue  -     CBC with Auto Differential; Future  -     Comprehensive Metabolic Panel; Future  -     TSH; Future  -     TSH  -     Comprehensive Metabolic Panel  -     CBC with Auto Differential    Long COVID  -     CBC with Auto Differential; Future  -     Comprehensive Metabolic Panel; Future  -     Vitamin B12; Future  -     Vitamin B12  -     Comprehensive Metabolic Panel  -     CBC with Auto Differential    Numbness and tingling of both legs below knees  -     CBC with Auto Differential; Future  -     Comprehensive Metabolic Panel; Future  -     Vitamin B12; Future  -     Vitamin B12  -     Comprehensive Metabolic Panel  -     CBC with Auto Differential       Orders Placed This Encounter   Medications    escitalopram (LEXAPRO) 10 MG tablet     Sig: Take 1 tablet by mouth daily     Dispense:  30 tablet     Refill:  3       Return in about 6 months (around 10/18/2024).    Will obtain labs today and call patient with results.  He would like to wean off Lexapro eventually.  Will refill Lexapro 10 mg today instructed on tapering dose by splitting pill to 5 mg for at least a week and then stopping.      ANDREY Real - NP

## 2024-04-19 LAB — VIT B12 SERPL-MCNC: 2392 PG/ML (ref 193–986)

## 2024-04-19 NOTE — RESULT ENCOUNTER NOTE
Please inform patient that his glucose was 101, normal is .  He just needs to make sure he is watching his intake of carbohydrates and sweets.  His vitamin B12 was 2392 normal is 123-986.  If he is taking vitamin supplements this could cause the increase vitamin B12 is excreted through the urine so this should not cause any complications.  Overall labs looked okay

## 2024-09-20 ENCOUNTER — OFFICE VISIT (OUTPATIENT)
Dept: FAMILY MEDICINE CLINIC | Facility: CLINIC | Age: 34
End: 2024-09-20

## 2024-09-20 ENCOUNTER — TELEPHONE (OUTPATIENT)
Dept: FAMILY MEDICINE CLINIC | Facility: CLINIC | Age: 34
End: 2024-09-20

## 2024-09-20 VITALS
WEIGHT: 224.8 LBS | HEIGHT: 68 IN | HEART RATE: 72 BPM | DIASTOLIC BLOOD PRESSURE: 92 MMHG | RESPIRATION RATE: 16 BRPM | OXYGEN SATURATION: 96 % | TEMPERATURE: 98.2 F | BODY MASS INDEX: 34.07 KG/M2 | SYSTOLIC BLOOD PRESSURE: 139 MMHG

## 2024-09-20 DIAGNOSIS — F41.0 GENERALIZED ANXIETY DISORDER WITH PANIC ATTACKS: ICD-10-CM

## 2024-09-20 DIAGNOSIS — U07.1 COVID-19: Primary | ICD-10-CM

## 2024-09-20 DIAGNOSIS — F41.1 GENERALIZED ANXIETY DISORDER WITH PANIC ATTACKS: ICD-10-CM

## 2024-09-20 DIAGNOSIS — R52 BODY ACHES: ICD-10-CM

## 2024-09-20 LAB
EXP DATE SOLUTION: NORMAL
EXP DATE SWAB: NORMAL
EXPIRATION DATE: NORMAL
INFLUENZA A ANTIGEN, POC: NEGATIVE
INFLUENZA B ANTIGEN, POC: NEGATIVE
LOT NUMBER POC: NORMAL
LOT NUMBER SOLUTION: NORMAL
LOT NUMBER SWAB: NORMAL
SARS-COV-2 RNA, POC: POSITIVE
VALID INTERNAL CONTROL, POC: YES

## 2024-09-20 RX ORDER — ESCITALOPRAM OXALATE 10 MG/1
10 TABLET ORAL DAILY
Qty: 30 TABLET | Refills: 5 | Status: SHIPPED | OUTPATIENT
Start: 2024-09-20

## 2024-09-20 RX ORDER — NIRMATRELVIR AND RITONAVIR 150-100 MG
KIT ORAL
Qty: 20 TABLET | Refills: 0 | Status: SHIPPED | OUTPATIENT
Start: 2024-09-20 | End: 2024-09-25

## 2024-09-20 RX ORDER — GUAIFENESIN 600 MG/1
600 TABLET, EXTENDED RELEASE ORAL 2 TIMES DAILY
Qty: 30 TABLET | Refills: 0 | Status: SHIPPED | OUTPATIENT
Start: 2024-09-20 | End: 2024-10-05

## 2024-09-20 ASSESSMENT — ENCOUNTER SYMPTOMS
COLOR CHANGE: 0
SORE THROAT: 1
VOMITING: 0
SWOLLEN GLANDS: 0
DIARRHEA: 0
BACK PAIN: 0
NAUSEA: 0
RHINORRHEA: 0
SHORTNESS OF BREATH: 0
COUGH: 1
WHEEZING: 0
SINUS PRESSURE: 0
ABDOMINAL PAIN: 0
CONSTIPATION: 0
SINUS PAIN: 0

## 2025-01-07 ENCOUNTER — OFFICE VISIT (OUTPATIENT)
Dept: FAMILY MEDICINE CLINIC | Facility: CLINIC | Age: 35
End: 2025-01-07
Payer: COMMERCIAL

## 2025-01-07 VITALS
DIASTOLIC BLOOD PRESSURE: 75 MMHG | HEART RATE: 85 BPM | BODY MASS INDEX: 34.87 KG/M2 | SYSTOLIC BLOOD PRESSURE: 136 MMHG | OXYGEN SATURATION: 96 % | WEIGHT: 230.1 LBS | HEIGHT: 68 IN | TEMPERATURE: 98.4 F

## 2025-01-07 DIAGNOSIS — U09.9 COVID-19 LONG HAULER: ICD-10-CM

## 2025-01-07 DIAGNOSIS — Z76.89 ESTABLISHING CARE WITH NEW DOCTOR, ENCOUNTER FOR: ICD-10-CM

## 2025-01-07 DIAGNOSIS — E66.812 CLASS 2 OBESITY DUE TO EXCESS CALORIES WITHOUT SERIOUS COMORBIDITY WITH BODY MASS INDEX (BMI) OF 35.0 TO 35.9 IN ADULT: ICD-10-CM

## 2025-01-07 DIAGNOSIS — E66.09 CLASS 2 OBESITY DUE TO EXCESS CALORIES WITHOUT SERIOUS COMORBIDITY WITH BODY MASS INDEX (BMI) OF 35.0 TO 35.9 IN ADULT: ICD-10-CM

## 2025-01-07 DIAGNOSIS — H66.006 RECURRENT ACUTE SUPPURATIVE OTITIS MEDIA WITHOUT SPONTANEOUS RUPTURE OF TYMPANIC MEMBRANE OF BOTH SIDES: ICD-10-CM

## 2025-01-07 DIAGNOSIS — F41.1 GENERALIZED ANXIETY DISORDER: Primary | ICD-10-CM

## 2025-01-07 PROCEDURE — 99213 OFFICE O/P EST LOW 20 MIN: CPT

## 2025-01-07 ASSESSMENT — ANXIETY QUESTIONNAIRES
IF YOU CHECKED OFF ANY PROBLEMS ON THIS QUESTIONNAIRE, HOW DIFFICULT HAVE THESE PROBLEMS MADE IT FOR YOU TO DO YOUR WORK, TAKE CARE OF THINGS AT HOME, OR GET ALONG WITH OTHER PEOPLE: NOT DIFFICULT AT ALL
1. FEELING NERVOUS, ANXIOUS, OR ON EDGE: NOT AT ALL
3. WORRYING TOO MUCH ABOUT DIFFERENT THINGS: NOT AT ALL
2. NOT BEING ABLE TO STOP OR CONTROL WORRYING: NOT AT ALL
5. BEING SO RESTLESS THAT IT IS HARD TO SIT STILL: NOT AT ALL
7. FEELING AFRAID AS IF SOMETHING AWFUL MIGHT HAPPEN: NOT AT ALL
4. TROUBLE RELAXING: NOT AT ALL
6. BECOMING EASILY ANNOYED OR IRRITABLE: NOT AT ALL
GAD7 TOTAL SCORE: 0

## 2025-01-07 ASSESSMENT — PATIENT HEALTH QUESTIONNAIRE - PHQ9
SUM OF ALL RESPONSES TO PHQ9 QUESTIONS 1 & 2: 0
SUM OF ALL RESPONSES TO PHQ QUESTIONS 1-9: 0
1. LITTLE INTEREST OR PLEASURE IN DOING THINGS: NOT AT ALL
2. FEELING DOWN, DEPRESSED OR HOPELESS: NOT AT ALL

## 2025-01-07 NOTE — PROGRESS NOTES
moderation.   Consider using APPS such as : NoWait ,My net diary , My fitness pals , loose it apps, or listening to pod cast.          Recurrent acute suppurative otitis media without spontaneous rupture of tympanic membrane of both sides   New, at goal (stable), continue current treatment plan and medication adherence emphasized  Patient was given antibiotics at the urgent care bilateral ears are recovering well complete course of medication       Establishing care with new doctor, encounter for                 Patient offered lab work today also reviewed previous labs, patient is agreeable to get lab work and his physical        Counseled regarding above diagnosis, including possible risks and complications, especially if left uncontrolled. Counseled regarding the possible side effects, risks, benefits and alternatives to treatment; patient and/or guardian verbalizes understanding, agrees, feels comfortable with and wishes to proceed with above treatment plan.    Call or go to ED immediately if symptoms worsen or persist. Advised patient to call with any new medication issues, and, as applicable, read all Rx info from pharmacy to assure aware of all possible risks and side effects of medication before taking.     Patient and/or guardian given opportunity to ask questions/raise concerns.The patient verbalized comfort and understanding of instructions.    I encourage further reading and education about your health conditions.Information on many health conditions is provided by the American Academy of Family [employer]icians: https://familydoctor.org/  Please bring any questions to me at your next visit.    Medication List:    Current Outpatient Medications   Medication Sig Dispense Refill    escitalopram (LEXAPRO) 10 MG tablet Take 1 tablet by mouth daily 30 tablet 5     No current facility-administered medications for this visit.            Return to Office: Return in about 6 months (around 7/7/2025), or if symptoms

## 2025-01-08 PROBLEM — E66.09 CLASS 2 OBESITY DUE TO EXCESS CALORIES WITHOUT SERIOUS COMORBIDITY WITH BODY MASS INDEX (BMI) OF 35.0 TO 35.9 IN ADULT: Status: ACTIVE | Noted: 2025-01-08

## 2025-01-08 PROBLEM — E66.812 CLASS 2 OBESITY DUE TO EXCESS CALORIES WITHOUT SERIOUS COMORBIDITY WITH BODY MASS INDEX (BMI) OF 35.0 TO 35.9 IN ADULT: Status: ACTIVE | Noted: 2025-01-08

## 2025-01-08 NOTE — ASSESSMENT & PLAN NOTE
Chronic, at goal (stable),   and lifestyle modifications recommended  Discussed the importance of losing weight and the effect of obesity on patient's overall health. We have discussed physical activity, diet and weight loss strategies to help patient achieve that goal.  Lifestyle modification discussed  Patient encouraged to increase physical activity as able,   Focus on what you eat, when you eat and how you eat.   Eating healthy : Avoid processed meals ,Avoid eating between meals ,Avoiding sugar drinks ,Have more fiber, increase fruits and vegetables, decrease sweets and carbs.  Patient encouraged to make daily lifestyle adjustments including taking the stairs instead of the elevator, not shopping when hungry to buy healthy snacks, having smaller portions, eat sweets in moderation.   Consider using APPS such as : [employer]factor ,My net diary , My fitness pals , loose it apps, or listening to pod cast.

## 2025-01-08 NOTE — ASSESSMENT & PLAN NOTE
Continue to monitor patient has already seen neurologist cardiologist call or come in early if patient is experiencing any symptoms

## 2025-05-08 DIAGNOSIS — F41.0 GENERALIZED ANXIETY DISORDER WITH PANIC ATTACKS: ICD-10-CM

## 2025-05-08 DIAGNOSIS — F41.1 GENERALIZED ANXIETY DISORDER WITH PANIC ATTACKS: ICD-10-CM

## 2025-05-08 RX ORDER — ESCITALOPRAM OXALATE 10 MG/1
10 TABLET ORAL DAILY
Qty: 30 TABLET | Refills: 1 | Status: SHIPPED | OUTPATIENT
Start: 2025-05-08

## 2025-05-08 NOTE — TELEPHONE ENCOUNTER
Pt calling requesting an rx for escitalopram (LEXAPRO) 10 MG tablet  States the rx has  at the pharmacy. Please send to [facility] on [address]. Pts next appt is 2025

## 2025-07-07 ENCOUNTER — OFFICE VISIT (OUTPATIENT)
Dept: FAMILY MEDICINE CLINIC | Facility: CLINIC | Age: 35
End: 2025-07-07
Payer: COMMERCIAL

## 2025-07-07 VITALS
RESPIRATION RATE: 18 BRPM | SYSTOLIC BLOOD PRESSURE: 126 MMHG | HEART RATE: 80 BPM | OXYGEN SATURATION: 96 % | DIASTOLIC BLOOD PRESSURE: 82 MMHG | TEMPERATURE: 98.4 F | HEIGHT: 67 IN | BODY MASS INDEX: 36.4 KG/M2 | WEIGHT: 231.9 LBS

## 2025-07-07 DIAGNOSIS — R79.89 ELEVATED VITAMIN B12 LEVEL: ICD-10-CM

## 2025-07-07 DIAGNOSIS — F41.1 GENERALIZED ANXIETY DISORDER WITH PANIC ATTACKS: ICD-10-CM

## 2025-07-07 DIAGNOSIS — E55.9 VITAMIN D DEFICIENCY: ICD-10-CM

## 2025-07-07 DIAGNOSIS — Z00.00 ENCOUNTER FOR WELL ADULT EXAM WITHOUT ABNORMAL FINDINGS: Primary | ICD-10-CM

## 2025-07-07 DIAGNOSIS — F41.0 GENERALIZED ANXIETY DISORDER WITH PANIC ATTACKS: ICD-10-CM

## 2025-07-07 DIAGNOSIS — Z00.00 ENCOUNTER FOR WELL ADULT EXAM WITHOUT ABNORMAL FINDINGS: ICD-10-CM

## 2025-07-07 LAB
25(OH)D3 SERPL-MCNC: 100 NG/ML (ref 30–100)
ALBUMIN SERPL-MCNC: 3.6 G/DL (ref 3.5–5)
ALBUMIN/GLOB SERPL: 1.2 (ref 1–1.9)
ALP SERPL-CCNC: 79 U/L (ref 40–129)
ALT SERPL-CCNC: 58 U/L (ref 8–55)
ANION GAP SERPL CALC-SCNC: 9 MMOL/L (ref 7–16)
AST SERPL-CCNC: 34 U/L (ref 15–37)
BASOPHILS # BLD: 0.03 K/UL (ref 0–0.2)
BASOPHILS NFR BLD: 0.4 % (ref 0–2)
BILIRUB SERPL-MCNC: 0.3 MG/DL (ref 0–1.2)
BUN SERPL-MCNC: 15 MG/DL (ref 6–23)
CALCIUM SERPL-MCNC: 9.4 MG/DL (ref 8.8–10.2)
CHLORIDE SERPL-SCNC: 106 MMOL/L (ref 98–107)
CHOLEST SERPL-MCNC: 217 MG/DL (ref 0–200)
CO2 SERPL-SCNC: 25 MMOL/L (ref 20–29)
CREAT SERPL-MCNC: 1.1 MG/DL (ref 0.8–1.3)
DIFFERENTIAL METHOD BLD: NORMAL
EOSINOPHIL # BLD: 0.17 K/UL (ref 0–0.8)
EOSINOPHIL NFR BLD: 2.2 % (ref 0.5–7.8)
ERYTHROCYTE [DISTWIDTH] IN BLOOD BY AUTOMATED COUNT: 13.7 % (ref 11.9–14.6)
FOLATE SERPL-MCNC: 15.6 NG/ML (ref 3.1–17.5)
GLOBULIN SER CALC-MCNC: 3.1 G/DL (ref 2.3–3.5)
GLUCOSE SERPL-MCNC: 102 MG/DL (ref 70–99)
HCT VFR BLD AUTO: 47.3 % (ref 41.1–50.3)
HDLC SERPL-MCNC: 72 MG/DL (ref 40–60)
HDLC SERPL: 3 (ref 0–5)
HGB BLD-MCNC: 15.1 G/DL (ref 13.6–17.2)
IMM GRANULOCYTES # BLD AUTO: 0.03 K/UL (ref 0–0.5)
IMM GRANULOCYTES NFR BLD AUTO: 0.4 % (ref 0–5)
LDLC SERPL CALC-MCNC: 127 MG/DL (ref 0–100)
LYMPHOCYTES # BLD: 2.86 K/UL (ref 0.5–4.6)
LYMPHOCYTES NFR BLD: 37 % (ref 13–44)
MCH RBC QN AUTO: 28.4 PG (ref 26.1–32.9)
MCHC RBC AUTO-ENTMCNC: 31.9 G/DL (ref 31.4–35)
MCV RBC AUTO: 88.9 FL (ref 82–102)
MONOCYTES # BLD: 0.61 K/UL (ref 0.1–1.3)
MONOCYTES NFR BLD: 7.9 % (ref 4–12)
NEUTS SEG # BLD: 4.04 K/UL (ref 1.7–8.2)
NEUTS SEG NFR BLD: 52.1 % (ref 43–78)
NRBC # BLD: 0 K/UL (ref 0–0.2)
PLATELET # BLD AUTO: 244 K/UL (ref 150–450)
PMV BLD AUTO: 11.6 FL (ref 9.4–12.3)
POTASSIUM SERPL-SCNC: 4.6 MMOL/L (ref 3.5–5.1)
PROT SERPL-MCNC: 6.7 G/DL (ref 6.3–8.2)
RBC # BLD AUTO: 5.32 M/UL (ref 4.23–5.6)
SODIUM SERPL-SCNC: 140 MMOL/L (ref 136–145)
TRIGL SERPL-MCNC: 89 MG/DL (ref 0–150)
TSH, 3RD GENERATION: 3.7 UIU/ML (ref 0.27–4.2)
VIT B12 SERPL-MCNC: 2394 PG/ML (ref 193–986)
VLDLC SERPL CALC-MCNC: 18 MG/DL (ref 6–23)
WBC # BLD AUTO: 7.7 K/UL (ref 4.3–11.1)

## 2025-07-07 PROCEDURE — 99395 PREV VISIT EST AGE 18-39: CPT

## 2025-07-07 RX ORDER — ESCITALOPRAM OXALATE 10 MG/1
10 TABLET ORAL DAILY
Qty: 90 TABLET | Refills: 1 | Status: SHIPPED | OUTPATIENT
Start: 2025-07-07

## 2025-07-07 RX ORDER — HYDROXYZINE HYDROCHLORIDE 25 MG/1
25 TABLET, FILM COATED ORAL EVERY 8 HOURS PRN
Qty: 30 TABLET | Refills: 0 | Status: SHIPPED | OUTPATIENT
Start: 2025-07-07 | End: 2025-07-17

## 2025-07-07 SDOH — ECONOMIC STABILITY: FOOD INSECURITY: WITHIN THE PAST 12 MONTHS, YOU WORRIED THAT YOUR FOOD WOULD RUN OUT BEFORE YOU GOT MONEY TO BUY MORE.: NEVER TRUE

## 2025-07-07 SDOH — ECONOMIC STABILITY: FOOD INSECURITY: WITHIN THE PAST 12 MONTHS, THE FOOD YOU BOUGHT JUST DIDN'T LAST AND YOU DIDN'T HAVE MONEY TO GET MORE.: NEVER TRUE

## 2025-07-07 ASSESSMENT — ENCOUNTER SYMPTOMS
EYE PAIN: 0
VOMITING: 0
NAUSEA: 0
BACK PAIN: 0
CHEST TIGHTNESS: 0
RHINORRHEA: 0
DIARRHEA: 0
ABDOMINAL DISTENTION: 0
SINUS PAIN: 0
EYE DISCHARGE: 0
SINUS PRESSURE: 0
CONSTIPATION: 0
SHORTNESS OF BREATH: 0
SORE THROAT: 0
ABDOMINAL PAIN: 0

## 2025-07-07 NOTE — PROGRESS NOTES
Well Adult Note  Name: Luke Guillen Today’s Date: 2025   MRN: [id number] Sex: Male   Age: 34 y.o. Ethnicity: Non- / Non    : 1990 Race: White (non-)      Luke Guillen is here for a well adult exam.          Assessment & Plan  1. Health maintenance.  - Elevated B12 levels noted.  - Glucose level recorded at 101, potentially influenced by recent coffee intake.  - Due for DTaP vaccine; last tetanus vaccine administered in .  - Recheck of B12 levels and other basic lab work to be conducted today; advised to consult OB/GYN regarding DTaP vaccine.    2. Anxiety.  - Currently taking Lexapro for anxiety and stress.  - No recent panic attacks reported; learned to manage symptoms.  - Hydroxyzine prescribed as needed for panic attacks; informed it may cause sleepiness if taken when not anxious.  - Prescription for hydroxyzine sent to pharmacy for future use.  Generalized anxiety disorder with panic attacks  The following orders have not been finalized:  -     escitalopram (LEXAPRO) 10 MG tablet        2025     2:33 PM 2023    12:07 PM   EMELY-7 SCREENING   Feeling nervous, anxious, or on edge Not at all Not at all   Not being able to stop or control worrying Not at all Not at all   Worrying too much about different things Not at all Not at all   Trouble relaxing Not at all Not at all   Being so restless that it is hard to sit still Not at all Not at all   Becoming easily annoyed or irritable Not at all Not at all   Feeling afraid as if something awful might happen Not at all Not at all   EMELY-7 Total Score 0 0   How difficult have these problems made it for you to do your work, take care of things at home, or get along with other people? Not difficult at all Not difficult at all         Results  Labs   - B12 levels: High   - Glucose level: 101 mg/dL     No follow-ups on file.     Subjective   History of Present Illness  The patient presents for a routine checkup.    He reports

## 2025-07-07 NOTE — PATIENT INSTRUCTIONS
hands, brush your teeth twice a day, and wear a seat belt in the car.   Where can you learn more?  Go to https://[email or web address].net/patientEd and enter P072 to learn more about \"Well Visit, Ages 18 to 65: Care Instructions.\"  Current as of: April 30, 2024  Content Version: 14.5  © 7604-8874 [employer].   Care instructions adapted under license by [employer]. If you have questions about a medical condition or this instruction, always ask your healthcare professional. [employer] disclaims any warranty or liability for your use of this information.

## 2025-07-08 ENCOUNTER — RESULTS FOLLOW-UP (OUTPATIENT)
Dept: FAMILY MEDICINE CLINIC | Facility: CLINIC | Age: 35
End: 2025-07-08